# Patient Record
Sex: MALE | Race: BLACK OR AFRICAN AMERICAN | NOT HISPANIC OR LATINO | Employment: FULL TIME | URBAN - METROPOLITAN AREA
[De-identification: names, ages, dates, MRNs, and addresses within clinical notes are randomized per-mention and may not be internally consistent; named-entity substitution may affect disease eponyms.]

---

## 2022-09-29 ENCOUNTER — OFFICE VISIT (OUTPATIENT)
Dept: FAMILY MEDICINE CLINIC | Facility: CLINIC | Age: 52
End: 2022-09-29

## 2022-09-29 VITALS
DIASTOLIC BLOOD PRESSURE: 84 MMHG | BODY MASS INDEX: 30.21 KG/M2 | OXYGEN SATURATION: 98 % | SYSTOLIC BLOOD PRESSURE: 122 MMHG | TEMPERATURE: 98 F | RESPIRATION RATE: 16 BRPM | WEIGHT: 204 LBS | HEART RATE: 76 BPM | HEIGHT: 69 IN

## 2022-09-29 DIAGNOSIS — F33.0 MAJOR DEPRESSIVE DISORDER, RECURRENT, MILD: ICD-10-CM

## 2022-09-29 DIAGNOSIS — E34.9 TESTOSTERONE DEFICIENCY: ICD-10-CM

## 2022-09-29 DIAGNOSIS — Z11.59 ENCOUNTER FOR HEPATITIS C SCREENING TEST FOR LOW RISK PATIENT: ICD-10-CM

## 2022-09-29 DIAGNOSIS — Z12.11 COLON CANCER SCREENING: ICD-10-CM

## 2022-09-29 DIAGNOSIS — R73.09 ELEVATED GLUCOSE: ICD-10-CM

## 2022-09-29 DIAGNOSIS — E66.09 CLASS 1 OBESITY DUE TO EXCESS CALORIES WITHOUT SERIOUS COMORBIDITY WITH BODY MASS INDEX (BMI) OF 30.0 TO 30.9 IN ADULT: ICD-10-CM

## 2022-09-29 DIAGNOSIS — Z00.00 ANNUAL PHYSICAL EXAM: Primary | ICD-10-CM

## 2022-09-29 DIAGNOSIS — F41.9 ANXIETY: ICD-10-CM

## 2022-09-29 PROCEDURE — 99386 PREV VISIT NEW AGE 40-64: CPT | Performed by: NURSE PRACTITIONER

## 2022-09-29 RX ORDER — BENZONATATE 100 MG/1
100 CAPSULE ORAL
COMMUNITY
Start: 2022-05-12

## 2022-09-29 RX ORDER — AZELASTINE HYDROCHLORIDE, FLUTICASONE PROPIONATE 137; 50 UG/1; UG/1
SPRAY, METERED NASAL
COMMUNITY
Start: 2022-09-05

## 2022-09-29 RX ORDER — TADALAFIL 20 MG/1
20 TABLET ORAL DAILY PRN
COMMUNITY
Start: 2022-07-29

## 2022-09-29 RX ORDER — BUPROPION HYDROCHLORIDE 150 MG/1
150 TABLET ORAL DAILY
COMMUNITY
Start: 2022-05-19 | End: 2023-05-19

## 2022-09-29 RX ORDER — OMEPRAZOLE 40 MG/1
CAPSULE, DELAYED RELEASE ORAL
COMMUNITY
Start: 2020-02-01

## 2022-09-29 RX ORDER — MELOXICAM 15 MG/1
1 TABLET ORAL DAILY PRN
COMMUNITY
Start: 2022-05-19 | End: 2023-05-19

## 2022-09-29 NOTE — PROGRESS NOTES
"Chief Complaint  Wellness Check    Subjective          Brian presents to [facility]  Brian Garay 51 y.o. male who presents for an Annual Wellness Visit.  he has a history of There is no problem list on file for this patient.  .  he has been feeling well.  Labs results discussed in detail with the patient.  Plan to update vaccines if needed today.  I  reviewed health maintenance with him as part of my preventative care plan.    Health Habits:  Dental Exam. up to date  Eye Exam. not up to date - needs to schedule  Exercise: 0 times/week.  Current exercise activities include: none    Diet/exercise: BMI 30.13  Tries to be consistent with a healthy diet.  Tries to remain active.     Social history: Never smoker social alcohol use no drug use     Sexual history: No concern for STD testing;      Sleep: no snoring or gasping no history       Tdap: Needs this  Colonoscopy: ordered    Brian Garay male 51 y.o., /84   Pulse 76   Temp 98 °F (36.7 °C)   Resp 16   Ht 175.3 cm (69\")   Wt 92.5 kg (204 lb)   SpO2 98%   BMI 30.13 kg/m²   who presents today for follow up of Depression, PTSD and Anxiety.  He reports anxiety and excessive worry. Onset of symptoms was approximately several years ago.  He denies current suicidal and homicidal ideation. Risk factors are family history of anxiety and or depression, lifestyle of multiple roles, family situation/stress, job stress and prior diagnosis of anxiety.  Previous treatment includes current Rx. He complains of the following medication side effects: none. The patient has previously been in counseling..  Currently taking Wellbutrin works well does not need any refills at this time.  History of PTSD related to .  PHQ-2 Depression Screening  Little interest or pleasure in doing things? 0-->not at all   Feeling down, depressed, or hopeless? 0-->not at all   PHQ-2 Total Score 0     Does have low back pain and knee " "pain.  Worse with bending lifting or twisting.  Is improved slightly with meloxicam and rest.  He has tried PT in the past and is unable to keep up with his work schedule.  He does wear braces and alternate ice and heat.  He does not feel like he needs any other interventions at this time.  He would like to work on weight loss to help with his pain.  He would like to try Saxenda-has no history of thyroid problems or thyroid cancer in his family    Blood pressures controlled in the 120s over 80s when checking at home.  Has any headaches no blurred vision no dizziness no flushing no chest pain or pressure    No other complaints at today      Review of Systems   Constitutional: Negative for chills, fatigue and fever.   Eyes: Negative for visual disturbance.   Respiratory: Negative for cough, shortness of breath and wheezing.    Cardiovascular: Negative for chest pain, palpitations and leg swelling.   Musculoskeletal: Positive for arthralgias and back pain.   Skin: Negative for rash.   Neurological: Negative for dizziness and light-headedness.   Psychiatric/Behavioral: Negative for dysphoric mood, self-injury and suicidal ideas. The patient is not nervous/anxious.         Objective   Vital Signs:   Vitals:    09/29/22 0816 09/29/22 0900   BP: 126/91 122/84   Pulse: 76    Resp: 16    Temp: 98 °F (36.7 °C)    SpO2: 98%    Weight: 92.5 kg (204 lb)    Height: 175.3 cm (69\")         BMI is >= 30 and <35. (Class 1 Obesity). The following options were offered after discussion;: weight loss educational material (shared in after visit summary)        Physical Exam  Vitals reviewed.   Constitutional:       General: He is not in acute distress.  HENT:      Head: Normocephalic.      Right Ear: Tympanic membrane, ear canal and external ear normal. There is no impacted cerumen.      Left Ear: Tympanic membrane, ear canal and external ear normal. There is no impacted cerumen.      Nose: Nose normal. No congestion.      Mouth/Throat: "      Mouth: Mucous membranes are moist.      Pharynx: Oropharynx is clear. No oropharyngeal exudate or posterior oropharyngeal erythema.   Eyes:      Extraocular Movements: Extraocular movements intact.      Conjunctiva/sclera: Conjunctivae normal.      Pupils: Pupils are equal, round, and reactive to light.   Neck:      Thyroid: No thyromegaly.      Vascular: No carotid bruit.   Cardiovascular:      Rate and Rhythm: Normal rate and regular rhythm.      Heart sounds: Normal heart sounds.   Pulmonary:      Effort: Pulmonary effort is normal. No respiratory distress.      Breath sounds: Normal breath sounds. No stridor. No wheezing, rhonchi or rales.   Chest:      Chest wall: No tenderness.   Abdominal:      General: Abdomen is flat. Bowel sounds are normal. There is no distension.      Palpations: Abdomen is soft. There is no mass.      Tenderness: There is no abdominal tenderness. There is no right CVA tenderness, left CVA tenderness, guarding or rebound.      Hernia: No hernia is present.   Skin:     General: Skin is warm.      Capillary Refill: Capillary refill takes less than 2 seconds.   Neurological:      Mental Status: He is alert and oriented to person, place, and time.   Psychiatric:         Attention and Perception: Attention normal.         Mood and Affect: Mood normal.          Result Review :     The following data was reviewed by: DANYA Medina on 09/29/2022:          T4, FREE (09/16/2021 10:46)  TSH (09/16/2021 10:46)  CBC AND DIFFERENTIAL (09/16/2021 10:46)  VITAMIN D 25 HYDROXY (09/16/2021 10:46) vitamin D low recommend daily supplementation  MicroAlbumin, Urine, Random - (09/16/2021 10:46)  LIPID PANEL (09/16/2021 10:46) triglycerides 167 total 247 work on low-cholesterol diet and exercise as tolerated  COMPREHENSIVE METABOLIC PANEL (09/16/2021 10:46) glucose 102 AST 48 decrease alcohol intake and avoid Tylenol  HEMOGLOBIN A1C (09/16/2021 10:46) glucose 102-globin A1c normal 5.0 recheck  with labs today    Assessment and Plan    Diagnoses and all orders for this visit:    1. Annual physical exam (Primary)  Comments:  Reviewed all screenings declined Tdap and zoster vaccine  Ordered today  colonoscopy  Orders:  -     Comprehensive Metabolic Panel  -     CBC & Differential  -     Lipid Panel  -     TSH  -     T4, Free  -     Hemoglobin A1c  -     Vitamin D 25 Hydroxy  -     Testosterone, Free, Total    2. Colon cancer screening  Comments:  Ordered today  Orders:  -     Ambulatory Referral For Screening Colonoscopy    3. Testosterone deficiency  Comments:  History of testosterone deficiency and low sex drive recheck with labs today  Supplementations in the past  Orders:  -     Testosterone, Free, Total    4. Elevated glucose  Comments:  Recheck CMP and hemoglobin A1c  Orders:  -     Ambulatory Referral For Screening Colonoscopy  -     Comprehensive Metabolic Panel  -     CBC & Differential  -     Lipid Panel  -     TSH  -     T4, Free  -     Hemoglobin A1c  -     Vitamin D 25 Hydroxy  -     Testosterone, Free, Total    5. Encounter for hepatitis C screening test for low risk patient  Comments:  One-time screening  Orders:  -     Hepatitis C Antibody    6. Anxiety  Comments:  Controlled on current management    7. Major depressive disorder, recurrent, mild (HCC)  Comments:  Controlled on current management  PHQ 2 0    8. Class 1 obesity due to excess calories without serious comorbidity with body mass index (BMI) of 30.0 to 30.9 in adult  Comments:  Trial Saxenda  Continue working on lower calorie diet and exercise as tolerated goal is 30 minutes most days of the week  Orders:  -     Liraglutide (SAXENDA) 18 MG/3ML injection pen; Inject 0.6mg under skin daily for week one, THEN 1.2mg daily for week two, THEN 1.8mg daily for week three, then 2.4mg daily for week four.  Dispense: 6 mL; Refill: 2          Low glycemic/cholesterol index diet  Exercise 30 minutes most days of the week  Make sure you get  results on any labs or tests we ordered today  We discussed medications and how to take them as prescribed  Sleep 6-8 hours each night if possible  If you have not signed up for [employer], please activate your code ASAP from your After Visit Summary today     LDL goal <100  LDL goal if heart disease <70  HDL goal >60  Triglyceride goal <150  BP goal =<130/80  Fasting glucose <100    Follow Up   Return in about 6 months (around 3/29/2023) for Next scheduled follow up.  Patient was given instructions and counseling regarding his condition or for health maintenance advice. Please see specific information pulled into the AVS if appropriate.

## 2022-10-01 LAB
25(OH)D3+25(OH)D2 SERPL-MCNC: 23.9 NG/ML (ref 30–100)
ALBUMIN SERPL-MCNC: 4.3 G/DL (ref 3.5–5.2)
ALBUMIN/GLOB SERPL: 1.9 G/DL
ALP SERPL-CCNC: 61 U/L (ref 39–117)
ALT SERPL-CCNC: 19 U/L (ref 1–41)
AST SERPL-CCNC: 18 U/L (ref 1–40)
BASOPHILS # BLD AUTO: 0.08 10*3/MM3 (ref 0–0.2)
BASOPHILS NFR BLD AUTO: 1.2 % (ref 0–1.5)
BILIRUB SERPL-MCNC: 0.3 MG/DL (ref 0–1.2)
BUN SERPL-MCNC: 13 MG/DL (ref 6–20)
BUN/CREAT SERPL: 11.8 (ref 7–25)
CALCIUM SERPL-MCNC: 9.9 MG/DL (ref 8.6–10.5)
CHLORIDE SERPL-SCNC: 105 MMOL/L (ref 98–107)
CHOLEST SERPL-MCNC: 175 MG/DL (ref 0–200)
CO2 SERPL-SCNC: 26.6 MMOL/L (ref 22–29)
CREAT SERPL-MCNC: 1.1 MG/DL (ref 0.76–1.27)
EGFRCR SERPLBLD CKD-EPI 2021: 80.8 ML/MIN/1.73
EOSINOPHIL # BLD AUTO: 0.2 10*3/MM3 (ref 0–0.4)
EOSINOPHIL NFR BLD AUTO: 3 % (ref 0.3–6.2)
ERYTHROCYTE [DISTWIDTH] IN BLOOD BY AUTOMATED COUNT: 13 % (ref 12.3–15.4)
GLOBULIN SER CALC-MCNC: 2.3 GM/DL
GLUCOSE SERPL-MCNC: 101 MG/DL (ref 65–99)
HBA1C MFR BLD: 5.6 % (ref 4.8–5.6)
HCT VFR BLD AUTO: 40.7 % (ref 37.5–51)
HCV AB S/CO SERPL IA: <0.1 S/CO RATIO (ref 0–0.9)
HDLC SERPL-MCNC: 68 MG/DL (ref 40–60)
HGB BLD-MCNC: 13.9 G/DL (ref 13–17.7)
IMM GRANULOCYTES # BLD AUTO: 0.01 10*3/MM3 (ref 0–0.05)
IMM GRANULOCYTES NFR BLD AUTO: 0.2 % (ref 0–0.5)
LDLC SERPL CALC-MCNC: 93 MG/DL (ref 0–100)
LYMPHOCYTES # BLD AUTO: 2.37 10*3/MM3 (ref 0.7–3.1)
LYMPHOCYTES NFR BLD AUTO: 36 % (ref 19.6–45.3)
MCH RBC QN AUTO: 32.3 PG (ref 26.6–33)
MCHC RBC AUTO-ENTMCNC: 34.2 G/DL (ref 31.5–35.7)
MCV RBC AUTO: 94.4 FL (ref 79–97)
MONOCYTES # BLD AUTO: 0.58 10*3/MM3 (ref 0.1–0.9)
MONOCYTES NFR BLD AUTO: 8.8 % (ref 5–12)
NEUTROPHILS # BLD AUTO: 3.35 10*3/MM3 (ref 1.7–7)
NEUTROPHILS NFR BLD AUTO: 50.8 % (ref 42.7–76)
NRBC BLD AUTO-RTO: 0 /100 WBC (ref 0–0.2)
PLATELET # BLD AUTO: 234 10*3/MM3 (ref 140–450)
POTASSIUM SERPL-SCNC: 4.6 MMOL/L (ref 3.5–5.2)
PROT SERPL-MCNC: 6.6 G/DL (ref 6–8.5)
RBC # BLD AUTO: 4.31 10*6/MM3 (ref 4.14–5.8)
SODIUM SERPL-SCNC: 142 MMOL/L (ref 136–145)
T4 FREE SERPL-MCNC: 1.1 NG/DL (ref 0.93–1.7)
TESTOST FREE SERPL-MCNC: 4.4 PG/ML (ref 7.2–24)
TESTOST SERPL-MCNC: 506 NG/DL (ref 264–916)
TRIGL SERPL-MCNC: 72 MG/DL (ref 0–150)
TSH SERPL DL<=0.005 MIU/L-ACNC: 1.65 UIU/ML (ref 0.27–4.2)
VLDLC SERPL CALC-MCNC: 14 MG/DL (ref 5–40)
WBC # BLD AUTO: 6.59 10*3/MM3 (ref 3.4–10.8)

## 2022-10-10 ENCOUNTER — TELEPHONE (OUTPATIENT)
Dept: FAMILY MEDICINE CLINIC | Facility: CLINIC | Age: 52
End: 2022-10-10

## 2022-10-10 NOTE — TELEPHONE ENCOUNTER
PATIENT CALLED TO CHECK THE STATUS OF THIS REQUEST.    ROUTED TO CORRECT OFFICE.    PLEASE ADVISE  [phone]

## 2022-10-10 NOTE — TELEPHONE ENCOUNTER
Caller: MAURA EAST    Relationship: Emergency Contact    Best call back number: [phone]    What was the call regarding: PATIENT'S WIFE STATED THAT THE PHARMACY TOLD HER THAT THERE NEEDS TO BE A PRIOR AUTHORIZATION COMPLETED FOR PATIENT'S WEIGHT LOSS MEDICATION TO GO TO INSURANCE, AND THAT THEY ARE WAITING ON THE PROVIDER TO COMPLETE THEIR PART. PLEASE ADVISE.     PATIENT'S PHARMACY:  [facility] #[id number] - [place], [place] - [address] AT [facility] [phone] [facility] [phone] FX    Do you require a callback: YES

## 2022-10-10 NOTE — TELEPHONE ENCOUNTER
PATIENT CALLED STATING THAT HE WOULD LIKE TO SEE IF CLAUDIA EL WOULD BE WILLING TO PRESCRIBE TESTOSTERONE INJECTIONS.    PATIENT SAYS THAT HIS LEVELS ARE ON THE LOW SIDE AND WOULD LIKE TO INCREASE THEM IF POSSIBLE.    PLEASE ADVISE  [phone]

## 2022-10-11 ENCOUNTER — OFFICE VISIT (OUTPATIENT)
Dept: FAMILY MEDICINE CLINIC | Facility: CLINIC | Age: 52
End: 2022-10-11

## 2022-10-11 VITALS
SYSTOLIC BLOOD PRESSURE: 119 MMHG | DIASTOLIC BLOOD PRESSURE: 79 MMHG | HEIGHT: 69 IN | TEMPERATURE: 97 F | BODY MASS INDEX: 29.92 KG/M2 | WEIGHT: 202 LBS | HEART RATE: 106 BPM | RESPIRATION RATE: 16 BRPM | OXYGEN SATURATION: 98 %

## 2022-10-11 DIAGNOSIS — R53.83 FATIGUE, UNSPECIFIED TYPE: ICD-10-CM

## 2022-10-11 DIAGNOSIS — E34.9 TESTOSTERONE DEFICIENCY: Primary | ICD-10-CM

## 2022-10-11 DIAGNOSIS — E66.09 CLASS 1 OBESITY DUE TO EXCESS CALORIES WITHOUT SERIOUS COMORBIDITY WITH BODY MASS INDEX (BMI) OF 30.0 TO 30.9 IN ADULT: ICD-10-CM

## 2022-10-11 PROCEDURE — 99214 OFFICE O/P EST MOD 30 MIN: CPT | Performed by: NURSE PRACTITIONER

## 2022-10-11 NOTE — PROGRESS NOTES
"Chief Complaint  Testosterone deficiency (Discuss Labs )    Subjective          Brian presents to [facility]  Is a 51-year-old male for follow-up.  To refill medications and review labs. No medication side effects are reported. Overall the patient feels well.    He did have a low testosterone level.  He was on testosterone supplementation in the past.  He does have some fatigue and ED he does have to take Cialis.  He would like a referral to urology to discuss treatment options    Is also waiting on a prior authorization for Saxenda today and that.  His BMI is 29.9  He is working on his diet and exercise    He has no other complaints at today    The following portions of the patient's history were reviewed and updated as appropriate: allergies, current medications, past family history, past medical history, past social history, past surgical history, and problem list    Review of Systems   Constitutional: Positive for fatigue. Negative for chills and fever.   Respiratory: Negative for cough, shortness of breath and wheezing.    Cardiovascular: Negative for chest pain, palpitations and leg swelling.   Skin: Negative for rash.   Neurological: Negative for dizziness and light-headedness.        Objective   Vital Signs:   Vitals:    10/11/22 1540   BP: 119/79   Pulse: 106   Resp: 16   Temp: 97 °F (36.1 °C)   TempSrc: Skin   SpO2: 98%   Weight: 91.6 kg (202 lb)   Height: 175.3 cm (69\")        BMI is >= 25 and <30. (Overweight) The following options were offered after discussion;: weight loss educational material (shared in after visit summary)        Physical Exam  Vitals reviewed.   Constitutional:       General: He is not in acute distress.  Eyes:      Conjunctiva/sclera: Conjunctivae normal.   Neck:      Thyroid: No thyromegaly.      Vascular: No carotid bruit.   Cardiovascular:      Rate and Rhythm: Normal rate and regular rhythm.      Heart sounds: Normal heart sounds. "   Pulmonary:      Effort: Pulmonary effort is normal.      Breath sounds: Normal breath sounds.   Neurological:      Mental Status: He is alert.   Psychiatric:         Attention and Perception: Attention normal.         Mood and Affect: Mood normal.          Result Review :     The following data was reviewed by: DANYA Medina on 10/11/2022:      Hepatitis C Antibody (09/29/2022 08:59)  Testosterone, Free, Total (09/29/2022 08:59)  Vitamin D 25 Hydroxy (09/29/2022 08:59)  Hemoglobin A1c (09/29/2022 08:59)  T4, Free (09/29/2022 08:59)  TSH (09/29/2022 08:59)  Lipid Panel (09/29/2022 08:59)  CBC & Differential (09/29/2022 08:59)  Comprehensive Metabolic Panel (09/29/2022 08:59)  Vitamin D is still slightly low-  recommend a daily otc supplement     Total testosterone in normal range  Cholesterol looks good     Assessment and Plan    Diagnoses and all orders for this visit:    1. Testosterone deficiency (Primary)  Comments:  Referral to urology to discuss treatment options for testosterone deficiency  Orders:  -     Ambulatory Referral to Urology    2. Fatigue, unspecified type  Comments:  Referral to urology to discuss treatment options for testosterone deficiency    3. Class 1 obesity due to excess calories without serious comorbidity with body mass index (BMI) of 30.0 to 30.9 in adult  Comments:  Continue to work on diet and exercise.  Goal is 30 minutes most days of the week  Waiting on PA for Saxenda        Follow Up   Return in about 6 months (around 4/11/2023), or if symptoms worsen or fail to improve, for Next scheduled follow up.  Patient was given instructions and counseling regarding his condition or for health maintenance advice. Please see specific information pulled into the AVS if appropriate.

## 2022-10-11 NOTE — TELEPHONE ENCOUNTER
I TALKED TO PATIENT . PT SCHEDULED AN APPOINTMENT 10/11/2022 330 TO DISCUSS FURTHER WITH CLAUDIA. NATE

## 2024-01-29 ENCOUNTER — OFFICE VISIT (OUTPATIENT)
Dept: URBAN - NONMETROPOLITAN AREA CLINIC 4 | Facility: CLINIC | Age: 54
End: 2024-01-29